# Patient Record
Sex: MALE | URBAN - METROPOLITAN AREA
[De-identification: names, ages, dates, MRNs, and addresses within clinical notes are randomized per-mention and may not be internally consistent; named-entity substitution may affect disease eponyms.]

---

## 2021-01-18 ENCOUNTER — OFFICE VISIT (OUTPATIENT)
Dept: URBAN - METROPOLITAN AREA CLINIC 54 | Facility: CLINIC | Age: 76
End: 2021-01-18
Payer: MEDICARE

## 2021-01-18 DIAGNOSIS — H04.123 DRY EYE SYNDROME OF BILATERAL LACRIMAL GLANDS: ICD-10-CM

## 2021-01-18 DIAGNOSIS — Z96.1 PRESENCE OF PSEUDOPHAKIA: Primary | ICD-10-CM

## 2021-01-18 PROCEDURE — 99203 OFFICE O/P NEW LOW 30 MIN: CPT | Performed by: OPHTHALMOLOGY

## 2021-01-18 PROCEDURE — 92134 CPTRZ OPH DX IMG PST SGM RTA: CPT | Performed by: OPHTHALMOLOGY

## 2021-01-18 ASSESSMENT — INTRAOCULAR PRESSURE
OS: 14
OD: 13

## 2021-01-18 NOTE — IMPRESSION/PLAN
Impression: Presence of pseudophakia: Z96.1. Bilateral. Plan: Patient here to rule out retinal hemorrhages. He underwent recent retinal photography. It appeared there may have been retinal hemorrhages on the photographs. Examination today reveals no retinal hemorrhages OU. Exam/OCT reveals no ERM/ME OU. The multifocal PCIOL is in good position OU. Patient reassured and will follow up with Dr. Diony Carney. 

Return PRN, OCT OU

## 2021-01-18 NOTE — IMPRESSION/PLAN
Impression: Dry eye syndrome of bilateral lacrimal glands: H04.123 Bilateral. Plan: Follow up with Dr. Moni Lemus.